# Patient Record
Sex: MALE | Race: WHITE | NOT HISPANIC OR LATINO | Employment: OTHER | URBAN - METROPOLITAN AREA
[De-identification: names, ages, dates, MRNs, and addresses within clinical notes are randomized per-mention and may not be internally consistent; named-entity substitution may affect disease eponyms.]

---

## 2022-11-18 DIAGNOSIS — M06.9 RHEUMATOID ARTHRITIS, INVOLVING UNSPECIFIED SITE, UNSPECIFIED WHETHER RHEUMATOID FACTOR PRESENT: Primary | ICD-10-CM

## 2022-12-01 ENCOUNTER — TELEPHONE (OUTPATIENT)
Dept: RHEUMATOLOGY | Facility: CLINIC | Age: 62
End: 2022-12-01

## 2022-12-01 RX ORDER — FUROSEMIDE 40 MG/1
40 TABLET ORAL 2 TIMES DAILY
COMMUNITY
Start: 2022-11-19 | End: 2023-07-13

## 2022-12-01 RX ORDER — SULFAMETHOXAZOLE AND TRIMETHOPRIM 800; 160 MG/1; MG/1
1 TABLET ORAL 2 TIMES DAILY
COMMUNITY
Start: 2022-11-23 | End: 2023-07-13

## 2022-12-01 RX ORDER — MECOBAL/LEVOMEFOLAT CA/B6 PHOS 2-3-35 MG
1 TABLET ORAL 2 TIMES DAILY
COMMUNITY
Start: 2022-08-15 | End: 2023-07-13

## 2022-12-01 RX ORDER — CLINDAMYCIN HYDROCHLORIDE 300 MG/1
300 CAPSULE ORAL EVERY 6 HOURS
COMMUNITY
Start: 2022-11-23 | End: 2023-07-13

## 2022-12-01 RX ORDER — BUPRENORPHINE HYDROCHLORIDE AND NALOXONE HYDROCHLORIDE DIHYDRATE 8; 2 MG/1; MG/1
3.5 TABLET SUBLINGUAL DAILY
COMMUNITY
Start: 2022-11-28

## 2022-12-01 RX ORDER — POTASSIUM CHLORIDE 20 MEQ/1
40 TABLET, EXTENDED RELEASE ORAL 2 TIMES DAILY
COMMUNITY
Start: 2022-11-19

## 2022-12-01 RX ORDER — METHOTREXATE 2.5 MG/1
10 TABLET ORAL
COMMUNITY
Start: 2022-11-19 | End: 2023-07-13 | Stop reason: SDUPTHER

## 2022-12-01 RX ORDER — TAMSULOSIN HYDROCHLORIDE 0.4 MG/1
1 CAPSULE ORAL EVERY 12 HOURS
COMMUNITY
Start: 2022-11-23

## 2022-12-01 RX ORDER — METRONIDAZOLE 500 MG/1
500 TABLET ORAL 2 TIMES DAILY
COMMUNITY
Start: 2022-11-08 | End: 2023-07-13

## 2022-12-01 RX ORDER — HYDROXYCHLOROQUINE SULFATE 200 MG/1
200 TABLET, FILM COATED ORAL 2 TIMES DAILY
COMMUNITY
Start: 2022-11-19 | End: 2023-07-13

## 2022-12-01 RX ORDER — METOPROLOL SUCCINATE 100 MG/1
100 TABLET, EXTENDED RELEASE ORAL DAILY
COMMUNITY
Start: 2022-10-17 | End: 2023-11-15

## 2022-12-01 RX ORDER — PHENAZOPYRIDINE HYDROCHLORIDE 100 MG/1
100 TABLET, FILM COATED ORAL 3 TIMES DAILY
COMMUNITY
Start: 2022-11-02 | End: 2023-07-13

## 2022-12-01 RX ORDER — FOLIC ACID 1 MG/1
1000 TABLET ORAL DAILY
COMMUNITY
Start: 2022-11-19 | End: 2023-07-13 | Stop reason: SDUPTHER

## 2022-12-01 RX ORDER — TOCILIZUMAB 180 MG/ML
INJECTION, SOLUTION SUBCUTANEOUS
COMMUNITY
Start: 2022-11-23 | End: 2023-05-23

## 2022-12-01 RX ORDER — MUPIROCIN 20 MG/G
OINTMENT TOPICAL
COMMUNITY
Start: 2022-11-28 | End: 2023-07-13

## 2022-12-01 RX ORDER — METOLAZONE 5 MG/1
5 TABLET ORAL
COMMUNITY
Start: 2022-11-19 | End: 2023-07-13

## 2022-12-01 RX ORDER — CLOPIDOGREL BISULFATE 75 MG/1
75 TABLET ORAL
COMMUNITY
Start: 2022-11-19 | End: 2023-07-13

## 2022-12-01 NOTE — TELEPHONE ENCOUNTER
----- Message from Allie Winn sent at 11/30/2022  1:35 PM CST -----  Patient contacted clinic today @ 2556    Patient was being seen by [place] Navarro in [facility]    Last visit with Dr Navarro was last month.    Patient needs appt Asap, due to cellulitis in r leg    Patient is not on spreadsheet    Please advise    Thanks    [phone]    
Spoke to Dr. Navarro  Called and spoke to pt  Explained to pt that doctor said to hold his Actemra and MTX while on ABX  Pt verbalized understanding  Stated the cellulitis is getting better  Advised him he will be contacted for an appt w/Dr. Navarro    
98.4

## 2023-03-23 ENCOUNTER — TELEPHONE (OUTPATIENT)
Dept: RHEUMATOLOGY | Facility: CLINIC | Age: 63
End: 2023-03-23

## 2023-03-23 RX ORDER — PREDNISONE 20 MG/1
TABLET ORAL
COMMUNITY
Start: 2023-03-15 | End: 2023-07-13

## 2023-03-23 RX ORDER — APIXABAN 5 MG/1
5 TABLET, FILM COATED ORAL DAILY
COMMUNITY
Start: 2023-03-08 | End: 2023-11-15

## 2023-03-23 RX ORDER — CIPROFLOXACIN 500 MG/1
500 TABLET ORAL 2 TIMES DAILY
COMMUNITY
Start: 2023-03-08 | End: 2023-07-13

## 2023-03-23 NOTE — TELEPHONE ENCOUNTER
Called and spoke to pt  Explained the Actemra needs a new PA and I need clinical info to submit it  Pt can come by tomorrow to sign records release  Completed release form and left at     PA initiated on Cover My Meds by pt's insurance  Will f/u once visit notes from [facility] are received

## 2023-03-28 ENCOUNTER — TELEPHONE (OUTPATIENT)
Dept: NEUROLOGY | Facility: CLINIC | Age: 63
End: 2023-03-28
Payer: MEDICAID

## 2023-03-28 NOTE — TELEPHONE ENCOUNTER
----- Message from Cleo Collier sent at 3/28/2023  3:14 PM CDT -----  Regarding: New PA needed  Libby CROUCH/ [facility] [phone] called and stated the PA for ACTEMRA expires 4/2/23 and pt will need a new PA moving forward.           Thank You  Alejandrina LOPEZ

## 2023-03-28 NOTE — TELEPHONE ENCOUNTER
Spoke to Pratibha, pharmacist, with [facility] and Mr Poole will need a PA for Actemra as his current one will  April 3, but next refill not due until at the earliest, April 10. Explained someone started a PA, but when I enter the information, Key [id number] with patient's name and -error message is received. Per Pratibha, they will submit the PA if we can provide OV documentation. Explained OV documentation is available and per Pratibha can fax to [phone] and she will place them in patient's record so they can submit PA when needed.

## 2023-04-12 DIAGNOSIS — L97.522 ULCER OF LEFT FOOT, WITH FAT LAYER EXPOSED: Primary | ICD-10-CM

## 2023-04-12 DIAGNOSIS — G60.3 IDIOPATHIC PROGRESSIVE POLYNEUROPATHY: ICD-10-CM

## 2023-05-01 ENCOUNTER — HOSPITAL ENCOUNTER (OUTPATIENT)
Dept: RADIOLOGY | Facility: HOSPITAL | Age: 63
Discharge: HOME | End: 2023-05-01
Attending: PODIATRIST
Payer: MEDICAID

## 2023-05-01 DIAGNOSIS — L97.522 ULCER OF LEFT FOOT, WITH FAT LAYER EXPOSED: ICD-10-CM

## 2023-05-01 DIAGNOSIS — G60.3 IDIOPATHIC PROGRESSIVE POLYNEUROPATHY: ICD-10-CM

## 2023-05-01 PROCEDURE — 78315 BONE IMAGING 3 PHASE: CPT | Mod: TC

## 2023-05-01 PROCEDURE — A9503 TC99M MEDRONATE: HCPCS

## 2023-05-18 DIAGNOSIS — M05.79 RHEUMATOID ARTHRITIS WITH RHEUMATOID FACTOR OF MULTIPLE SITES WITHOUT ORGAN OR SYSTEMS INVOLVEMENT: ICD-10-CM

## 2023-05-19 NOTE — TELEPHONE ENCOUNTER
Called PT to see if he had blood work drawn in the last 3 months. He has some done last week at [facility].      I will request results.

## 2023-05-23 RX ORDER — TOCILIZUMAB 180 MG/ML
INJECTION, SOLUTION SUBCUTANEOUS
Qty: 3.6 EACH | Refills: 6 | Status: SHIPPED | OUTPATIENT
Start: 2023-05-23 | End: 2023-07-13 | Stop reason: ALTCHOICE

## 2023-05-23 NOTE — TELEPHONE ENCOUNTER
Send a refill on Actemra.  Potassium level is low, is taking any potassium supplements?  Was managed by her PCP or Cardiology, he should follow-up with them as soon as possible for hypokalemia.  Please forward the results to their office.  - if he is not taking potassium supplements let me know I will send the prescription.

## 2023-07-10 ENCOUNTER — TELEPHONE (OUTPATIENT)
Dept: RHEUMATOLOGY | Facility: CLINIC | Age: 63
End: 2023-07-10

## 2023-07-10 NOTE — TELEPHONE ENCOUNTER
----- Message from Cleo Collier sent at 7/6/2023 10:52 AM CDT -----  Regarding: Clarification on rx  Evangelista w/ [facility] [phone] called for clarification on rx ACTEMRA ACTPEN 162 mg/0.9 mL PnIj.        Thank You  Alejandrina LOPEZ

## 2023-07-10 NOTE — TELEPHONE ENCOUNTER
Spoke with pharmacist staff Arabella from [facility]  clarification given to dispense 4 pens because 3.6 was written on the order.

## 2023-07-13 ENCOUNTER — OFFICE VISIT (OUTPATIENT)
Dept: RHEUMATOLOGY | Facility: CLINIC | Age: 63
End: 2023-07-13
Payer: MEDICAID

## 2023-07-13 ENCOUNTER — TELEPHONE (OUTPATIENT)
Dept: RHEUMATOLOGY | Facility: CLINIC | Age: 63
End: 2023-07-13

## 2023-07-13 ENCOUNTER — LAB VISIT (OUTPATIENT)
Dept: LAB | Facility: HOSPITAL | Age: 63
End: 2023-07-13
Attending: INTERNAL MEDICINE
Payer: MEDICAID

## 2023-07-13 VITALS
HEIGHT: 71 IN | RESPIRATION RATE: 20 BRPM | WEIGHT: 278.19 LBS | DIASTOLIC BLOOD PRESSURE: 84 MMHG | HEART RATE: 88 BPM | OXYGEN SATURATION: 96 % | SYSTOLIC BLOOD PRESSURE: 132 MMHG | BODY MASS INDEX: 38.94 KG/M2 | TEMPERATURE: 98 F

## 2023-07-13 DIAGNOSIS — M05.79 SEROPOSITIVE RHEUMATOID ARTHRITIS OF MULTIPLE SITES: Primary | ICD-10-CM

## 2023-07-13 DIAGNOSIS — M05.79 SEROPOSITIVE RHEUMATOID ARTHRITIS OF MULTIPLE SITES: ICD-10-CM

## 2023-07-13 DIAGNOSIS — J84.9 ILD (INTERSTITIAL LUNG DISEASE): ICD-10-CM

## 2023-07-13 DIAGNOSIS — Z79.899 HIGH RISK MEDICATION USE: ICD-10-CM

## 2023-07-13 DIAGNOSIS — R79.89 ELEVATED LFTS: ICD-10-CM

## 2023-07-13 DIAGNOSIS — I50.9 CHRONIC CONGESTIVE HEART FAILURE, UNSPECIFIED HEART FAILURE TYPE: ICD-10-CM

## 2023-07-13 DIAGNOSIS — Z86.39: ICD-10-CM

## 2023-07-13 DIAGNOSIS — I87.2 CHRONIC VENOUS INSUFFICIENCY: ICD-10-CM

## 2023-07-13 DIAGNOSIS — L03.90 WOUND CELLULITIS: ICD-10-CM

## 2023-07-13 DIAGNOSIS — I10 PRIMARY HYPERTENSION: ICD-10-CM

## 2023-07-13 LAB
ALBUMIN SERPL-MCNC: 4.3 G/DL (ref 3.4–4.8)
ALBUMIN/GLOB SERPL: 1.3 RATIO (ref 1.1–2)
ALP SERPL-CCNC: 120 UNIT/L (ref 40–150)
ALT SERPL-CCNC: 32 UNIT/L (ref 0–55)
AST SERPL-CCNC: 25 UNIT/L (ref 5–34)
BASOPHILS # BLD AUTO: 0.04 X10(3)/MCL
BASOPHILS NFR BLD AUTO: 0.7 %
BILIRUBIN DIRECT+TOT PNL SERPL-MCNC: 0.9 MG/DL
BUN SERPL-MCNC: 19.8 MG/DL (ref 8.4–25.7)
CALCIUM SERPL-MCNC: 9.6 MG/DL (ref 8.8–10)
CHLORIDE SERPL-SCNC: 102 MMOL/L (ref 98–107)
CO2 SERPL-SCNC: 28 MMOL/L (ref 23–31)
CREAT SERPL-MCNC: 1.1 MG/DL (ref 0.73–1.18)
CRP SERPL-MCNC: 0.5 MG/L
EOSINOPHIL # BLD AUTO: 0.34 X10(3)/MCL (ref 0–0.9)
EOSINOPHIL NFR BLD AUTO: 5.9 %
ERYTHROCYTE [DISTWIDTH] IN BLOOD BY AUTOMATED COUNT: 15.9 % (ref 11.5–17)
ERYTHROCYTE [SEDIMENTATION RATE] IN BLOOD: 5 MM/HR (ref 0–15)
GFR SERPLBLD CREATININE-BSD FMLA CKD-EPI: >60 MLS/MIN/1.73/M2
GLOBULIN SER-MCNC: 3.4 GM/DL (ref 2.4–3.5)
GLUCOSE SERPL-MCNC: 134 MG/DL (ref 82–115)
HCT VFR BLD AUTO: 49 % (ref 42–52)
HGB BLD-MCNC: 15.6 G/DL (ref 14–18)
IMM GRANULOCYTES # BLD AUTO: 0.03 X10(3)/MCL (ref 0–0.04)
IMM GRANULOCYTES NFR BLD AUTO: 0.5 %
LYMPHOCYTES # BLD AUTO: 1.19 X10(3)/MCL (ref 0.6–4.6)
LYMPHOCYTES NFR BLD AUTO: 20.8 %
MCH RBC QN AUTO: 27 PG (ref 27–31)
MCHC RBC AUTO-ENTMCNC: 31.8 G/DL (ref 33–36)
MCV RBC AUTO: 84.8 FL (ref 80–94)
MONOCYTES # BLD AUTO: 0.37 X10(3)/MCL (ref 0.1–1.3)
MONOCYTES NFR BLD AUTO: 6.5 %
NEUTROPHILS # BLD AUTO: 3.75 X10(3)/MCL (ref 2.1–9.2)
NEUTROPHILS NFR BLD AUTO: 65.6 %
NRBC BLD AUTO-RTO: 0 %
PLATELET # BLD AUTO: 250 X10(3)/MCL (ref 130–400)
PMV BLD AUTO: 9.6 FL (ref 7.4–10.4)
POTASSIUM SERPL-SCNC: 4.3 MMOL/L (ref 3.5–5.1)
PROT SERPL-MCNC: 7.7 GM/DL (ref 5.8–7.6)
RBC # BLD AUTO: 5.78 X10(6)/MCL (ref 4.7–6.1)
SODIUM SERPL-SCNC: 138 MMOL/L (ref 136–145)
WBC # SPEC AUTO: 5.72 X10(3)/MCL (ref 4.5–11.5)

## 2023-07-13 PROCEDURE — 4010F PR ACE/ARB THEARPY RXD/TAKEN: ICD-10-PCS | Mod: CPTII,,, | Performed by: INTERNAL MEDICINE

## 2023-07-13 PROCEDURE — 1159F MED LIST DOCD IN RCRD: CPT | Mod: CPTII,,, | Performed by: INTERNAL MEDICINE

## 2023-07-13 PROCEDURE — 86140 C-REACTIVE PROTEIN: CPT

## 2023-07-13 PROCEDURE — 3079F PR MOST RECENT DIASTOLIC BLOOD PRESSURE 80-89 MM HG: ICD-10-PCS | Mod: CPTII,,, | Performed by: INTERNAL MEDICINE

## 2023-07-13 PROCEDURE — 3075F PR MOST RECENT SYSTOLIC BLOOD PRESS GE 130-139MM HG: ICD-10-PCS | Mod: CPTII,,, | Performed by: INTERNAL MEDICINE

## 2023-07-13 PROCEDURE — 85025 COMPLETE CBC W/AUTO DIFF WBC: CPT

## 2023-07-13 PROCEDURE — 99215 PR OFFICE/OUTPT VISIT, EST, LEVL V, 40-54 MIN: ICD-10-PCS | Mod: S$PBB,,, | Performed by: INTERNAL MEDICINE

## 2023-07-13 PROCEDURE — 3079F DIAST BP 80-89 MM HG: CPT | Mod: CPTII,,, | Performed by: INTERNAL MEDICINE

## 2023-07-13 PROCEDURE — 36415 COLL VENOUS BLD VENIPUNCTURE: CPT

## 2023-07-13 PROCEDURE — 1159F PR MEDICATION LIST DOCUMENTED IN MEDICAL RECORD: ICD-10-PCS | Mod: CPTII,,, | Performed by: INTERNAL MEDICINE

## 2023-07-13 PROCEDURE — 3075F SYST BP GE 130 - 139MM HG: CPT | Mod: CPTII,,, | Performed by: INTERNAL MEDICINE

## 2023-07-13 PROCEDURE — 85652 RBC SED RATE AUTOMATED: CPT

## 2023-07-13 PROCEDURE — 80053 COMPREHEN METABOLIC PANEL: CPT

## 2023-07-13 PROCEDURE — 3008F BODY MASS INDEX DOCD: CPT | Mod: CPTII,,, | Performed by: INTERNAL MEDICINE

## 2023-07-13 PROCEDURE — 4010F ACE/ARB THERAPY RXD/TAKEN: CPT | Mod: CPTII,,, | Performed by: INTERNAL MEDICINE

## 2023-07-13 PROCEDURE — 99215 OFFICE O/P EST HI 40 MIN: CPT | Mod: S$PBB,,, | Performed by: INTERNAL MEDICINE

## 2023-07-13 PROCEDURE — 99214 OFFICE O/P EST MOD 30 MIN: CPT | Mod: PBBFAC | Performed by: INTERNAL MEDICINE

## 2023-07-13 PROCEDURE — 3008F PR BODY MASS INDEX (BMI) DOCUMENTED: ICD-10-PCS | Mod: CPTII,,, | Performed by: INTERNAL MEDICINE

## 2023-07-13 RX ORDER — FOLIC ACID 1 MG/1
1000 TABLET ORAL DAILY
Qty: 30 TABLET | Refills: 6 | Status: SHIPPED | OUTPATIENT
Start: 2023-07-13 | End: 2023-11-15

## 2023-07-13 RX ORDER — HYDROXYCHLOROQUINE SULFATE 200 MG/1
200 TABLET, FILM COATED ORAL 2 TIMES DAILY
Qty: 60 TABLET | Refills: 6 | Status: SHIPPED | OUTPATIENT
Start: 2023-07-13 | End: 2023-08-12

## 2023-07-13 RX ORDER — METHOTREXATE 2.5 MG/1
20 TABLET ORAL
Qty: 40 TABLET | Refills: 3 | Status: SHIPPED | OUTPATIENT
Start: 2023-07-13 | End: 2023-11-15 | Stop reason: SDUPTHER

## 2023-07-13 RX ORDER — FINASTERIDE 5 MG/1
5 TABLET, FILM COATED ORAL DAILY
COMMUNITY

## 2023-07-13 RX ORDER — SPIRONOLACTONE 25 MG/1
25 TABLET ORAL DAILY
COMMUNITY

## 2023-07-13 RX ORDER — TORSEMIDE 20 MG/1
40 TABLET ORAL 2 TIMES DAILY
COMMUNITY

## 2023-07-13 NOTE — PROGRESS NOTES
Patient ID: [id number]     Chief Complaint: Rheumatoid Arthritis (Pt has been having joint pain for the last few months. Feels like the Actemra is not helping and is leading to more health problems. )      Referred By: No ref. provider found     HPI:     Virgilio Mariscal Jr. is a 62 y.o. male here today for a new patient visit.        Taking MTX 20 mg per week with folic acid 1 mg daily.   Actemra helped with joint pain but having recurrent infections on it, has lower extremity wounds and cellulitis. Has not been able to take it regularly due to infections.  Currently has pain in bilateral hands and wrists.  Did not tolerate Orencia.  H/o CHF and function around 40% per patient. Never had MI. H/o A fib and thyrotoxicosis. He has PVS two stents in legs recently. He is seeing AGUSTÍN Montez in [place].   Complaining of wounds on bilateral lower extremities.  He is currently under wound care.  He was diagnosed with thyrotoxicosis. Medical management, PCP watching, not on any treatment.   PVD s/p bilateral stents. Chronic venous insufficiency and swelling of legs.     Autoimmune diseases in family: none.   Denies fevers, oral or nasal ulcers, rashes, photosensitivity, history of DVT or PE, history of stroke or seizures, history of Ml, history of inflammatory eye diseases, history of malignancy, Raynaud's phenomenon.   Smoking: nonsmoker.   Denies recent hospitalizations.     Social History     Tobacco Use   Smoking Status Former    Packs/day: 1.00    Years: 25.00    Pack years: 25.00    Types: Cigarettes   Smokeless Tobacco Never          ----------------------------  Congestive heart failure (CHF)  Paroxysmal atrial fibrillation  PVD (peripheral vascular disease)  Rheumatoid arthritis, unspecified     Past Surgical History:   Procedure Laterality Date    HERNIA REPAIR         Review of patient's allergies indicates:  No Active Allergies    Outpatient Medications Marked as Taking for the 7/13/23 encounter (Office  Visit) with Latia Navarro MD   Medication Sig Dispense Refill    buprenorphine-naloxone 8-2 (SUBOXONE) 8-2 mg Subl Place 3.5 tablets under the tongue once daily.      ELIQUIS 5 mg Tab Take 5 mg by mouth Daily.      finasteride (PROSCAR) 5 mg tablet Take 5 mg by mouth once daily.      metoprolol succinate (TOPROL-XL) 100 MG 24 hr tablet Take 100 mg by mouth once daily.      potassium chloride SA (K-DUR,KLOR-CON) 20 MEQ tablet Take 40 mEq by mouth 2 (two) times daily.      spironolactone (ALDACTONE) 25 MG tablet Take 25 mg by mouth once daily.      tamsulosin (FLOMAX) 0.4 mg Cap Take 1 capsule by mouth every 12 (twelve) hours.      torsemide (DEMADEX) 20 MG Tab Take 40 mg by mouth 2 (two) times daily.      [DISCONTINUED] ACTEMRA ACTPEN 162 mg/0.9 mL PnIj INJECT 1 INJECTION SUBCUTANEOUSLY ONCE A WEEK 3.6 each 6    [DISCONTINUED] folic acid (FOLVITE) 1 MG tablet Take 1,000 mcg by mouth once daily.      [DISCONTINUED] methotrexate 2.5 MG Tab Take 10 mg by mouth every 7 days.         Social History     Socioeconomic History    Marital status:    Tobacco Use    Smoking status: Former     Packs/day: 1.00     Years: 25.00     Pack years: 25.00     Types: Cigarettes    Smokeless tobacco: Never   Substance and Sexual Activity    Alcohol use: Not Currently    Drug use: Never        Family History   Problem Relation Age of Onset    COPD Father           There is no immunization history on file for this patient.    Patient Care Team:  MARK Cesar as PCP - General (Family Medicine)     Subjective:     Constitutional:  Denies chills. Denies fever. Denies night sweats. Denies weight loss.   Ophthalmology: Denies blurred vision. Denies dry eyes. Denies eye pain. Denies Itching and redness.   ENT: Denies oral ulcers. Denies epistaxis. Denies dry mouth. Denies swollen glands.   Endocrine: Denies diabetes. Denies thyroid Problems.   Respiratory: Denies cough. Denies shortness of breath. Denies shortness of breath with  "exertion. Denies hemoptysis.   Cardiovascular: Denies chest pain at rest. Denies chest pain with exertion. Denies palpitations.    Gastrointestinal: Denies abdominal pain. Denies diarrhea. Denies nausea. Denies vomiting. Denies hematemesis or hematochezia. Denies heartburn.  Genitourinary: Denies blood in urine.  Musculoskeletal: See HPI for details  Integumentary: Denies rash. Denies photosensitivity.   Peripheral Vascular: Denies Ulcers of hands and/or feet. Denies Cold extremities.   Neurologic: Denies dizziness. Denies headache.  Denies loss of strength. Denies numbness or tingling.   Psychiatric: Denies depression. Denies anxiety. Denies suicidal/homicidal ideations.      Objective:     /84 (BP Location: Right arm, Patient Position: Sitting, BP Method: Large (Automatic))   Pulse 88   Temp 97.5 °F (36.4 °C) (Oral)   Resp 20   Ht 5' 11" (1.803 m)   Wt 126.2 kg (278 lb 3.2 oz)   SpO2 96%   BMI 38.80 kg/m²     Physical Exam    General Appearance: alert, pleasant, in no acute distress.  Skin: Skin color, texture, turgor normal. No rashes or lesions. B/l lower extremities covered with dressings.   Eyes:  extraocular movement intact (EOMI), pupils equal, round, reactive to light and accommodation, conjunctiva clear.  ENT: No oral or nasal ulcers.  Neck:  Neck supple. No adenopathy.   Lungs: CTA throughout without crackles, rhonchi, or wheezes.   Heart: RRR w/o murmurs.  No edema.   Abdomen: Soft, non-tender, no masses, rebound or guarding.  Neuro: Alert, oriented, CN II-XII GI, sensory and motor innervation intact.  Musculoskeletal: Tenderness in right 2nd and 3rd MCP, in left 4th MCP, swelling and tenderness in bilateral PIP's. Haberden's nodes. Tenderness with palpation and ROM of right glenohumeral joint. Moderate cool effusion in left knee, no tenderness with palpation. Fair range of motion  Psych: Alert, oriented, normal eye contact.    Labs:      June 2021: GFR 58. CRP 3.6, normal less than 1.0. ESR " 28, normal less than 20. Rheumatoid factor 690, anti-CCP greater than 250. Uric acid 7.2.   2/15/2022:TSH is low, less than 0.007. Uric acid normal at 5.6. ESR normal, CRP 3.6. Hemoglobin 15.0. Platelet count 444. AST 41, alk   phos 140. GFR 85. Creatinine 0.95. Hepatitis B negative. QuantiFERON-TB negative. Hepatitis C negative. HIV negative.   2022: CBC okay. Potassium low 2.5. Alk phos 168. Chloride slightly low. Creatinine 0.9.     Imagin/15/2022:Chronic interstitial opacities and emphysema on chest  x-ray. Osteoarthritis of midfoot bilaterally, no erosions. Osteoarthritis of bilateral hands, no erosions.   CT chest 3/10/2022: Paraseptal emphysematous changes with superimposed interstitial fibrosis. Reticulonodular changes in the dependent aspects of lung. Subcentimeter mediastinal lymph nodes, nonspecific could be reactive. Bronchiectasis.     10/10/2022: FVC 86%, FEV1 88%, ratio 76%, total lung capacity 101%, DLCO 88%, DLCO by %. Minimal airway obstruction suggesting small airway disease.     Normal eye exam on [facility] on 10/2021, reviewed records from Dr. Yunier Pulido.         Assessment:       ICD-10-CM ICD-9-CM   1. Seropositive rheumatoid arthritis of multiple sites  M05.79 714.0   2. Chronic venous insufficiency  I87.2 459.81   3. Primary hypertension  I10 401.9   4. Chronic congestive heart failure, unspecified heart failure type  I50.9 428.0   5. H/O thyrotoxicosis  Z86.39 V12.29   6. ILD (interstitial lung disease)  J84.9 515   7. Wound cellulitis  L03.90 682.9   8. High risk medication use  Z79.899 V58.69   9. Elevated LFTs  R79.89 790.6        Plan:        1. Seropositive rheumatoid arthritis: History of inflammatory arthritis in bilateral hands and wrists. Positive rheumatoid factor and anti-CCP, high titer. Dr. Blake started him on MTX per patient in . C/o intermittent flares of joint pain in hands and elbows. Continue with methotrexate 20 mg/week with folic acid 1 mg  daily.  Did not tolerate Orencia. Actemra helped with synovitis but having recurrent infection on it, has lower extremity wounds and cellulitis. Stop Actemra.   - Start Plaquenil 200 mg b.i.d..  Discussed the risks and benefits of medication with the patient. He has heart failure and ILD, will try to avoid TNF inhibitors.  After lower extremity wounds are healed then we will consider starting him on biologic.  - Labs today.     2. ILD and emphysema changes: CT chest 3/10/2022: Paraseptal emphysematous changes with superimposed interstitial fibrosis. Reticulonodular changes in the dependent aspects of lung.   Subcentimeter mediastinal lymph nodes, nonspecific could be reactive. Bronchiectasis. Denies any symptoms, denies cough or SOB. PFT's showed mild small airway disease. Repeat PFT's to assess progression.    3. B/l  lower extremity wound, continue close follow-up with wound care.    4. Osteoarthritis: Cold effusion of right knee and DJD of spine. Discussed the disease course and treatment options of osteoarthritis. Advised to take Tylenol as needed for joint pain. Continue to do stretches in the past     5. Heart failure/A. fib: Continue follow-up with cardiology, used to see Dr. Dorsey, now seeing Dr Venegas.     6. AST slightly high: will monitor. Discussed watching his diet and weight loss, could be form fatty liver. Will monitor. He does not drink alcohol.     7. History of thyrotoxicosis: Not received any treatment. TSH low, advised to discuss with his PCP.     8. High risk medication use:  Advised to stay up-to-date on appropriate cancer screenings and vaccinations.   - Persons with rheumatoid arthritis, lupus, psoriatic arthritis and other autoimmune diseases are at increased risk of cardiovascular disease including heart attack and stroke. We recommend that all patients with these conditions have annual health maintenance exams including lipid measurements, blood pressure measurements, and smoking  cessation counseling when applicable at their primary care provider's office.       Follow up in about 3 months (around 10/13/2023) for with me. In addition to their scheduled follow up, the patient has also been instructed to follow up on as needed basis.        Total time spent with patient and documentation is more than 50 minutes. All questions were answered to patient's satisfaction and patient verbalized understanding.

## 2023-08-10 ENCOUNTER — HOSPITAL ENCOUNTER (OUTPATIENT)
Dept: PULMONOLOGY | Facility: HOSPITAL | Age: 63
Discharge: HOME | End: 2023-08-10
Attending: INTERNAL MEDICINE
Payer: MEDICAID

## 2023-08-10 DIAGNOSIS — M05.79 SEROPOSITIVE RHEUMATOID ARTHRITIS OF MULTIPLE SITES: ICD-10-CM

## 2023-08-10 LAB
DLCO SINGLE BREATH LLN: 22.68
DLCO SINGLE BREATH PRE REF: 63.8 %
DLCO SINGLE BREATH REF: 29.6
DLCOC SBVA LLN: 2.9
DLCOC SBVA REF: 4.04
DLCOC SINGLE BREATH LLN: 22.68
DLCOC SINGLE BREATH REF: 29.6
DLCOVA LLN: 2.9
DLCOVA PRE REF: 87.6 %
DLCOVA PRE: 3.54 ML/(MIN*MMHG*L) (ref 2.9–5.18)
DLCOVA REF: 4.04
ERV LLN: -16448.81
ERV PRE REF: 124.9 %
ERV REF: 1.19
FEF 25 75 LLN: 1.37
FEF 25 75 PRE REF: 63.9 %
FEF 25 75 REF: 2.88
FEV1 FVC LLN: 64
FEV1 FVC PRE REF: 100.6 %
FEV1 FVC REF: 77
FEV1 LLN: 2.67
FEV1 PRE REF: 65.2 %
FEV1 REF: 3.59
FRCPLETH LLN: 2.7
FRCPLETH PREREF: 115.7 %
FRCPLETH REF: 3.69
FVC LLN: 3.54
FVC PRE REF: 64.5 %
FVC REF: 4.68
IVC PRE: 3.53 L (ref 3.54–5.84)
IVC SINGLE BREATH LLN: 3.54
IVC SINGLE BREATH PRE REF: 75.5 %
IVC SINGLE BREATH REF: 4.68
MVV LLN: 118
MVV PRE REF: 59 %
MVV REF: 139
PEF LLN: 6.92
PEF PRE REF: 85.2 %
PEF REF: 9.3
PRE DLCO: 18.89 ML/(MIN*MMHG) (ref 22.68–36.53)
PRE ERV: 1.49 L (ref -16448.81–16451.19)
PRE FEF 25 75: 1.84 L/S (ref 1.37–4.96)
PRE FET 100: 6.52 SEC
PRE FEV1 FVC: 77.34 % (ref 64.48–87.66)
PRE FEV1: 2.34 L (ref 2.67–4.45)
PRE FRC PL: 4.27 L (ref 2.7–4.67)
PRE FVC: 3.02 L (ref 3.54–5.84)
PRE MVV: 81.75 L/MIN (ref 117.87–159.46)
PRE PEF: 7.92 L/S (ref 6.92–11.68)
PRE RV: 2.78 L (ref 1.82–3.17)
PRE TLC: 6.23 L (ref 6.18–8.48)
RAW LLN: 3.06
RAW PRE REF: 59.8 %
RAW PRE: 1.83 CMH2O*S/L (ref 3.06–3.06)
RAW REF: 3.06
RV LLN: 1.82
RV PRE REF: 111.4 %
RV REF: 2.5
RVTLC LLN: 29
RVTLC PRE REF: 117 %
RVTLC PRE: 44.61 % (ref 29.16–47.12)
RVTLC REF: 38
TLC LLN: 6.18
TLC PRE REF: 85 %
TLC REF: 7.33
VA PRE: 5.34 L (ref 7.18–7.18)
VA SINGLE BREATH LLN: 7.18
VA SINGLE BREATH PRE REF: 74.3 %
VA SINGLE BREATH REF: 7.18
VC LLN: 3.54
VC PRE REF: 73.7 %
VC PRE: 3.45 L (ref 3.54–5.84)
VC REF: 4.68

## 2023-08-10 PROCEDURE — 94729 DIFFUSING CAPACITY: CPT

## 2023-08-10 PROCEDURE — 94010 BREATHING CAPACITY TEST: CPT

## 2023-08-10 PROCEDURE — 94726 PLETHYSMOGRAPHY LUNG VOLUMES: CPT

## 2023-08-18 ENCOUNTER — TELEPHONE (OUTPATIENT)
Dept: RHEUMATOLOGY | Facility: CLINIC | Age: 63
End: 2023-08-18

## 2023-08-18 DIAGNOSIS — J84.9 ILD (INTERSTITIAL LUNG DISEASE): Primary | ICD-10-CM

## 2023-08-18 NOTE — TELEPHONE ENCOUNTER
PFT showed decline in lung function, order CT chest to further evaluate.  Does he follows with Pulmonary?  If no then we will have to refer him to Pulmonary, Dr. Meier in [place] as he is close to him.

## 2023-08-22 ENCOUNTER — TELEPHONE (OUTPATIENT)
Dept: RHEUMATOLOGY | Facility: CLINIC | Age: 63
End: 2023-08-22

## 2023-08-22 NOTE — TELEPHONE ENCOUNTER
----- Message from Chika Guadalupe sent at 8/22/2023 10:00 AM CDT -----  Regarding: FW: resend ref and call office    ----- Message -----  From: Cleo Collier  Sent: 8/22/2023   9:11 AM CDT  To: [facility] Clinical Support Staff  Subject: resend ref and call office                       Stephanie CROUCH/Dr. Meier [phone] called and stated she recv'd a referral from [facility] and all pages did not go through. MsKrystyna Stephanie ask that you resend the referral and call her as she does have some questions      Thank You        Alejandrina LOPEZ

## 2023-09-15 ENCOUNTER — TELEPHONE (OUTPATIENT)
Dept: RHEUMATOLOGY | Facility: CLINIC | Age: 63
End: 2023-09-15

## 2023-11-15 ENCOUNTER — OFFICE VISIT (OUTPATIENT)
Dept: RHEUMATOLOGY | Facility: CLINIC | Age: 63
End: 2023-11-15

## 2023-11-15 ENCOUNTER — HOSPITAL ENCOUNTER (OUTPATIENT)
Dept: RADIOLOGY | Facility: HOSPITAL | Age: 63
Discharge: HOME | End: 2023-11-15
Attending: INTERNAL MEDICINE

## 2023-11-15 VITALS
RESPIRATION RATE: 18 BRPM | DIASTOLIC BLOOD PRESSURE: 110 MMHG | TEMPERATURE: 98 F | BODY MASS INDEX: 38.83 KG/M2 | OXYGEN SATURATION: 95 % | HEART RATE: 102 BPM | WEIGHT: 277.38 LBS | SYSTOLIC BLOOD PRESSURE: 157 MMHG | HEIGHT: 71 IN

## 2023-11-15 DIAGNOSIS — I27.20 PULMONARY HTN: ICD-10-CM

## 2023-11-15 DIAGNOSIS — I10 PRIMARY HYPERTENSION: ICD-10-CM

## 2023-11-15 DIAGNOSIS — Z86.39: ICD-10-CM

## 2023-11-15 DIAGNOSIS — J84.9 ILD (INTERSTITIAL LUNG DISEASE): ICD-10-CM

## 2023-11-15 DIAGNOSIS — M05.79 SEROPOSITIVE RHEUMATOID ARTHRITIS OF MULTIPLE SITES: ICD-10-CM

## 2023-11-15 DIAGNOSIS — I87.2 CHRONIC VENOUS INSUFFICIENCY: ICD-10-CM

## 2023-11-15 DIAGNOSIS — D84.821 DRUG-INDUCED IMMUNODEFICIENCY: ICD-10-CM

## 2023-11-15 DIAGNOSIS — L03.90 WOUND CELLULITIS: ICD-10-CM

## 2023-11-15 DIAGNOSIS — R79.89 ELEVATED LFTS: ICD-10-CM

## 2023-11-15 DIAGNOSIS — Z79.899 DRUG-INDUCED IMMUNODEFICIENCY: ICD-10-CM

## 2023-11-15 DIAGNOSIS — I50.22 CHRONIC SYSTOLIC CONGESTIVE HEART FAILURE: ICD-10-CM

## 2023-11-15 DIAGNOSIS — M05.79 SEROPOSITIVE RHEUMATOID ARTHRITIS OF MULTIPLE SITES: Primary | ICD-10-CM

## 2023-11-15 PROCEDURE — 99215 PR OFFICE/OUTPT VISIT, EST, LEVL V, 40-54 MIN: ICD-10-PCS | Mod: S$PBB,,, | Performed by: INTERNAL MEDICINE

## 2023-11-15 PROCEDURE — 73560 X-RAY EXAM OF KNEE 1 OR 2: CPT | Mod: TC,RT

## 2023-11-15 PROCEDURE — 99215 OFFICE O/P EST HI 40 MIN: CPT | Mod: S$PBB,,, | Performed by: INTERNAL MEDICINE

## 2023-11-15 PROCEDURE — 99215 OFFICE O/P EST HI 40 MIN: CPT | Mod: PBBFAC | Performed by: INTERNAL MEDICINE

## 2023-11-15 RX ORDER — SERTRALINE HYDROCHLORIDE 50 MG/1
50 TABLET, FILM COATED ORAL DAILY
Qty: 30 TABLET | Refills: 3 | Status: SHIPPED | OUTPATIENT
Start: 2023-11-15

## 2023-11-15 RX ORDER — TOCILIZUMAB 180 MG/ML
162 INJECTION, SOLUTION SUBCUTANEOUS
Qty: 4 EACH | Refills: 6 | Status: SHIPPED | OUTPATIENT
Start: 2023-11-15

## 2023-11-15 RX ORDER — BUPRENORPHINE HYDROCHLORIDE 8 MG/1
8 TABLET SUBLINGUAL 3 TIMES DAILY
COMMUNITY
End: 2023-11-15

## 2023-11-15 RX ORDER — VALSARTAN 80 MG/1
80 TABLET ORAL DAILY
COMMUNITY
Start: 2023-11-02

## 2023-11-15 RX ORDER — HYDROXYCHLOROQUINE SULFATE 200 MG/1
400 TABLET, FILM COATED ORAL DAILY
Qty: 60 TABLET | Refills: 6 | Status: SHIPPED | OUTPATIENT
Start: 2023-11-15

## 2023-11-15 RX ORDER — ISOPROPYL ALCOHOL 70 ML/100ML
SWAB TOPICAL
COMMUNITY

## 2023-11-15 RX ORDER — METHOTREXATE 2.5 MG/1
20 TABLET ORAL
Qty: 40 TABLET | Refills: 3 | Status: SHIPPED | OUTPATIENT
Start: 2023-11-15

## 2023-11-15 RX ORDER — CLOPIDOGREL BISULFATE 75 MG/1
75 TABLET ORAL DAILY
COMMUNITY

## 2023-11-15 RX ORDER — PREDNISONE 5 MG/1
5 TABLET ORAL DAILY
COMMUNITY
Start: 2023-11-07

## 2023-11-15 RX ORDER — HYDROXYCHLOROQUINE SULFATE 200 MG/1
400 TABLET, FILM COATED ORAL DAILY
COMMUNITY
Start: 2023-10-28 | End: 2023-11-15 | Stop reason: SDUPTHER

## 2023-11-15 NOTE — PROGRESS NOTES
Patient ID: [id number]     Chief Complaint: Rheumatoid Arthritis (Pain to right knee for 1 week having an active flare/)      HPI:     Virgilio Mariscal Jr. is a 63 y.o. male here today for follow-up of rheumatoid arthritis.    Taking MTX 20 mg per week with folic acid 1 mg daily.   Actemra helped with joint pain but discontinued for the concern of lower extremity cellulitis in the wounds.  Per patient he continues to get intermittent lower extremity wounds because of circulation issues and no difference since stopping Actemra.  Complaining of pain in the right knee started last week and intermittent pain in left ankle and left wrists.  He wants to restart Actemra.    He continues to take Plaquenil 200 mg b.i.d..  Did not tolerate Orencia.  H/o CHF and function around 40% per patient in the past which improved to 50% in 2023. Never had MI. H/o A fib and thyrotoxicosis. He has PVS two stents in legs recently. He is seeing Dr Rubi Rodriguez, [facility] in [place].   Complaining of wounds on bilateral lower extremities.  He sees wound care intermittently as needed.  He was diagnosed with thyrotoxicosis. Medical management, PCP monitoring, not on any treatment.   PVD s/p bilateral stents. Chronic venous insufficiency and swelling of legs.   Was told his Medicaid was not acitve today, he will call the insurance.    Autoimmune diseases in family: none.   Denies fevers, oral or nasal ulcers, rashes, photosensitivity, history of DVT or PE, history of stroke or seizures, history of Ml, history of inflammatory eye diseases, history of malignancy, Raynaud's phenomenon.   Smoking: nonsmoker.   Denies recent hospitalizations.     Social History     Tobacco Use   Smoking Status Former    Current packs/day: 0.00    Average packs/day: 1 pack/day for 35.0 years (35.0 ttl pk-yrs)    Types: Cigarettes    Start date: 10/26/1983    Quit date: 10/26/2018    Years since quittin.0   Smokeless Tobacco Never           ----------------------------  Congestive heart failure (CHF)  Paroxysmal atrial fibrillation  PVD (peripheral vascular disease)  Rheumatoid arthritis, unspecified     Past Surgical History:   Procedure Laterality Date    HERNIA REPAIR         Review of patient's allergies indicates:   Allergen Reactions    Sulfa (sulfonamide antibiotics) Itching       Outpatient Medications Marked as Taking for the 11/15/23 encounter (Office Visit) with Latia Navarro MD   Medication Sig Dispense Refill    alcohol swabs PadM use as directed      buprenorphine-naloxone 8-2 (SUBOXONE) 8-2 mg Subl Place 3.5 tablets under the tongue once daily.      clopidogreL (PLAVIX) 75 mg tablet Take 75 mg by mouth once daily.      finasteride (PROSCAR) 5 mg tablet Take 5 mg by mouth once daily.      folic acid (FOLVITE) 1 MG tablet Take 1 tablet (1,000 mcg total) by mouth once daily. 30 tablet 6    potassium chloride SA (K-DUR,KLOR-CON) 20 MEQ tablet Take 40 mEq by mouth 2 (two) times daily.      predniSONE (DELTASONE) 5 MG tablet Take 5 mg by mouth Daily.      spironolactone (ALDACTONE) 25 MG tablet Take 25 mg by mouth once daily.      tamsulosin (FLOMAX) 0.4 mg Cap Take 1 capsule by mouth every 12 (twelve) hours.      torsemide (DEMADEX) 20 MG Tab Take 40 mg by mouth 2 (two) times daily.      valsartan (DIOVAN) 80 MG tablet Take 80 mg by mouth once daily.      [DISCONTINUED] buprenorphine HCL (SUBUTEX) 8 mg Subl Place 8 mg under the tongue 3 (three) times daily.      [DISCONTINUED] hydroxychloroquine (PLAQUENIL) 200 mg tablet Take 400 mg by mouth once daily.      [DISCONTINUED] methotrexate 2.5 MG Tab Take 8 tablets (20 mg total) by mouth every 7 days. 40 tablet 3    [DISCONTINUED] metoprolol succinate (TOPROL-XL) 100 MG 24 hr tablet Take 100 mg by mouth once daily.         Social History     Socioeconomic History    Marital status:    Tobacco Use    Smoking status: Former     Current packs/day: 0.00     Average packs/day: 1  "pack/day for 35.0 years (35.0 ttl pk-yrs)     Types: Cigarettes     Start date: 10/26/1983     Quit date: 10/26/2018     Years since quittin.0    Smokeless tobacco: Never   Substance and Sexual Activity    Alcohol use: Not Currently    Drug use: Never        Family History   Problem Relation Age of Onset    COPD Father           There is no immunization history on file for this patient.    Patient Care Team:  Araseli Poole FNP-C as PCP - General (Family Medicine)     Subjective:     Constitutional:  Denies chills. Denies fever. Denies night sweats. Denies weight loss.   Ophthalmology: Denies blurred vision. Denies dry eyes. Denies eye pain. Denies Itching and redness.   ENT: Denies oral ulcers. Denies epistaxis. Denies dry mouth. Denies swollen glands.   Endocrine: Denies diabetes. Denies thyroid Problems.   Respiratory: Denies cough. Denies shortness of breath. Denies shortness of breath with exertion. Denies hemoptysis.   Cardiovascular: Denies chest pain at rest. Denies chest pain with exertion. Denies palpitations.    Gastrointestinal: Denies abdominal pain. Denies diarrhea. Denies nausea. Denies vomiting. Denies hematemesis or hematochezia. Denies heartburn.  Genitourinary: Denies blood in urine.  Musculoskeletal: See HPI for details  Integumentary: Denies rash. Denies photosensitivity.   Peripheral Vascular: Denies Ulcers of hands and/or feet. Denies Cold extremities.   Neurologic: Denies dizziness. Denies headache.  Denies loss of strength. Denies numbness or tingling.   Psychiatric: Denies depression. Denies anxiety. Denies suicidal/homicidal ideations.      Objective:     BP (!) 157/110 (BP Location: Left arm, Patient Position: Sitting, BP Method: Large (Automatic))   Pulse 102   Temp 97.8 °F (36.6 °C) (Oral)   Resp 18   Ht 5' 11" (1.803 m)   Wt 125.8 kg (277 lb 6.4 oz)   SpO2 95%   BMI 38.69 kg/m²     Physical Exam    General Appearance: alert, pleasant, in no acute distress.  Skin: Skin " color, texture, turgor normal. No rashes or lesions. B/l lower extremities covered with Band-Aid, chronic venous changes in bilateral lower extremities below knees with significant edema.   Eyes:  extraocular movement intact (EOMI), pupils equal, round, reactive to light and accommodation, conjunctiva clear.  ENT: No oral or nasal ulcers.  Neck:  Neck supple. No adenopathy.   Lungs: CTA throughout without crackles, rhonchi, or wheezes.   Heart: RRR w/o murmurs.     Abdomen: Soft, non-tender, no masses, rebound or guarding.  Neuro: Alert, oriented, CN II-XII GI, sensory and motor innervation intact.  Musculoskeletal: Tenderness in right 2nd and 3rd MCP, in left 4th MCP improved, swelling and tenderness in bilateral PIP's improved. Haberden's nodes. Tenderness with palpation and ROM of right glenohumeral joint. Moderate cool effusion in left knee, no tenderness with palpation. Fair range of motion. Tenderness with palpation of right knee at area of pes anserine bursa, no redness, moderate swelling of right knee.  Psych: Alert, oriented, normal eye contact.    Labs:     2021: GFR 58. CRP 3.6, normal less than 1.0. ESR 28, normal less than 20. Rheumatoid factor 690, anti-CCP greater than 250. Uric acid 7.2.   2/15/2022:TSH is low, less than 0.007. Uric acid normal at 5.6. ESR normal, CRP 3.6. Hemoglobin 15.0. Platelet count 444. AST 41, alk   phos 140. GFR 85. Creatinine 0.95. Hepatitis B negative. QuantiFERON-TB negative. Hepatitis C negative. HIV negative.   2022: CBC okay. Potassium low 2.5. Alk phos 168. Chloride slightly low. Creatinine 0.9.   5/15/23: CBC ok. K 3.1. low. Alk phos 133.CMP ok.   23:  CMP okay.  CBC okay.  ESR, CRP normal.    Imagin/15/2022:Chronic interstitial opacities and emphysema on chest  x-ray. Osteoarthritis of midfoot bilaterally, no erosions. Osteoarthritis of bilateral hands, no erosions.   CT chest 3/10/2022: Paraseptal emphysematous changes with superimposed  interstitial fibrosis. Reticulonodular changes in the dependent aspects of lung. Subcentimeter mediastinal lymph nodes, nonspecific could be reactive. Bronchiectasis.     Echocardiogram 06/14/2023 showed EF 50-55%, biatrial enlargement which is severe.  Moderate left ventricular hypertrophy.  Abnormal global wall motion of left ventricle.  Technically difficult study due to patient in atrial fibrillation.  Moderate pulmonary hypertension with PASP 52 mmHg.  No pericardial effusion.    10/10/2022: FVC 86%, FEV1 88%, ratio 76%, total lung capacity 101%, DLCO 88%, DLCO by %. Minimal airway obstruction suggesting small airway disease.   PFTs 8/11/2023 showed FVC 64%, FEV1 65%, ratio 77, total lung capacity 85%, DLCO 63%, DLCO by [facility] 87%.    Normal eye exam on [facility] on 10/2021, reviewed records from Dr. Yunier Pulido.       Assessment:       ICD-10-CM ICD-9-CM   1. Seropositive rheumatoid arthritis of multiple sites  M05.79 714.0   2. ILD (interstitial lung disease)  J84.9 515   3. Chronic venous insufficiency  I87.2 459.81   4. Primary hypertension  I10 401.9   5. Chronic systolic congestive heart failure  I50.22 428.22     428.0   6. H/O thyrotoxicosis  Z86.39 V12.29   7. Wound cellulitis  L03.90 682.9   8. Elevated LFTs  R79.89 790.6   9. Drug-induced immunodeficiency  D84.821 279.3    Z79.899 E947.9   10. Pulmonary HTN  I27.20 416.8          Plan:        1. Seropositive rheumatoid arthritis: History of inflammatory arthritis in bilateral hands and wrists. Positive rheumatoid factor and anti-CCP, high titer. Dr. Blake started him on MTX per patient in 2021. C/o intermittent flares of joint pain in hands and elbows. Continue with methotrexate 20 mg/week with folic acid 1 mg daily.  Did not tolerate Orencia. Actemra helped with synovitis but discontinued due to lower extremity wounds and cellulitis. Pain in right knee today. Willing to restart Actemra as it helped with joint pain in the past, continues to  have intermittent LE wounds even off of Actemra. Actemra not making wounds worse.  - C/w  Plaquenil 200 mg b.i.d..    - He has heart failure and ILD, will try to avoid TNF inhibitors.    - Labs today.     2. ILD and emphysema changes: CT chest 3/10/2022: Paraseptal emphysematous changes with superimposed interstitial fibrosis. Reticulonodular changes in the dependent aspects of lung.   Subcentimeter mediastinal lymph nodes, nonspecific could be reactive. Bronchiectasis. Denies any symptoms, denies cough or SOB. PFT's showed mild small airway disease. Repeat PFT's showed decline in FVC and DLCO in August 2023.  Order CT scan of chest, he will have the test done as soon as the insurance is approved.  Will also do ILD workup after he gets insurance.    3. B/l  lower extremity wound, continue close follow-up with wound care.      4. Osteoarthritis: Cold effusion of right knee and DJD of spine. Discussed the disease course and treatment options of osteoarthritis. Advised to take Tylenol as needed for joint pain. Continue to do stretches in the past.  We will get x-ray of right knee.    5. Heart failure/A. Fib, pulmonary hypertension: Continue follow-up with cardiology, used to see Dr. Dorsey, now seeing Dr Venegas.  Echocardiogram in June 2023 showed improvement of ejection fraction, ejection fraction around 50%.  PASP elevated 52 mmHg, recommend continuous close follow-up with cardiology.  Needs right heart catheterization, if his cardiologist does not do it by next visit discussed with the patient and recommend scheduling with different cardiologists.    6. AST slightly high: will monitor. Discussed watching his diet and weight loss, could be form fatty liver. Will monitor. He does not drink alcohol.     7. History of thyrotoxicosis: Not received any treatment. TSH low, PCP monitoring it.    8. High risk medication use:  Advised to stay up-to-date on appropriate cancer screenings and vaccinations.   - Persons with  rheumatoid arthritis, lupus, psoriatic arthritis and other autoimmune diseases are at increased risk of cardiovascular disease including heart attack and stroke. We recommend that all patients with these conditions have annual health maintenance exams including lipid measurements, blood pressure measurements, and smoking cessation counseling when applicable at their primary care provider's office.       Follow up in about 3 months (around 2/15/2024) for with me. In addition to their scheduled follow up, the patient has also been instructed to follow up on as needed basis.        Total time spent with patient and documentation is more than 40 minutes. All questions were answered to patient's satisfaction and patient verbalized understanding.

## 2024-03-07 ENCOUNTER — TELEPHONE (OUTPATIENT)
Dept: RHEUMATOLOGY | Facility: CLINIC | Age: 64
End: 2024-03-07

## 2024-03-07 NOTE — TELEPHONE ENCOUNTER
Received PA request via cover my meds with key: [id number] for Actemra ACTPen 162MG/0.9ML auto-injectors. Last labs were completed on 11/15/23 after office visit. Patient has upcoming appointment next week on 3/13/24 with Dr. Navarro.

## 2024-07-17 NOTE — PROGRESS NOTES
[facility] requested a referral for [facility].  Unable to accommodate the patient due to his insurance coverage is out of network.  Notified [facility] unable to accomodate patient due to out of network status.  Informed them I would discard referral.  They voiced understanding.